# Patient Record
(demographics unavailable — no encounter records)

---

## 2024-11-07 NOTE — IMAGING
[Right] : right knee [There are no fractures, subluxations or dislocations. No significant abnormalities are seen] : There are no fractures, subluxations or dislocations. No significant abnormalities are seen [Degenerative change] : Degenerative change

## 2024-11-07 NOTE — PHYSICAL EXAM
[Right] : right knee [NL (0)] : extension 0 degrees [4___] : hamstring 4[unfilled]/5 [Equivocal] : equivocal Nichole [] : non-antalgic [TWNoteComboBox7] : flexion 125 degrees

## 2024-11-07 NOTE — DISCUSSION/SUMMARY
[de-identified] : Ibuprofen 600mg BID prn pain, which she has.  The patient's orthopaedic condition(s) warrants consideration of consistent or intermittent use of a prescription strength non-steroidal anti-inflammatory medication.  These medications are associated with risks including but not limited to gastrointestinal irritation, kidney damage, hypertension, and bleeding. The patient notes they already have a valid prescription for the medication. The patient understands the risks and will take medications as prescribed previously.  The patient will stop the medication and consult a physician as needed if problems arise.  HEP and PT.  Injection as above.  Follow up 4 - 6 weeks.  Will consider MRI if pain persists.

## 2024-11-07 NOTE — HISTORY OF PRESENT ILLNESS
[Right Leg] : right leg [8] : 8 [6] : 6 [Dull/Aching] : dull/aching [Meds] : meds [Standing] : standing [Walking] : walking [de-identified] : 11/07/2024  :ARASH VALLE , a71 year old female, presents today for right knee, pt stated that her knee has been bothering her for a couple of months. [] : no [FreeTextEntry7] : RT Leg [FreeTextEntry9] : Ibuprofen

## 2024-11-07 NOTE — PROCEDURE
[FreeTextEntry3] : Procedure Name: Large Joint Injection / Aspiration: Celestone, Lidocaine and Marcaine with Ultrasound Evaluation and Guidance   Large Joint Injection was performed because of pain and inflammation. Anesthesia: ethyl chloride sprayed topically.   Celestone: An injection of Celestone 12 mg , 2 cc.   Lidocaine 1%: 3 cc.   Marcaine 0.25%:  3 cc.    Patient has tried OTC's including aspirin, Ibuprofen, Aleve etc or prescription NSAIDS, and/or exercises at home and/ or physical therapy without satisfactory response and Patient has decreased mobility in the joint. The risks, benefits, and alternatives to cortisone injection were explained in full to the patient. Risks outlined include but are not limited to infection, sepsis, bleeding, scarring, skin discoloration, temporary increase in pain, syncopal episode, failure to resolve symptoms, allergic reaction, symptom recurrence, and elevation of blood sugar in diabetics. Patient understood the risks. All questions were answered.   Oral informed consent was obtained.   Sterile technique was utilized for the procedure including the preparation of the solutions used for the injection. Medication was injected into RIGHT KNEE.   Patient tolerated the procedure well. Advised to ice the injection site this evening.  Post Procedure Instructions: Patient was advised to call if redness, pain, or fever occur and apply ice for 15 min. out of every hour for the next 12-24 hours as tolerated. patient was advised to rest the joint(s) for 2 days.    Ultrasound Extremity (59097) was used because of the following reasons: inflammation.   Ultrasound Guidance was used for the following reasons: for accurate placement of needle into knee joint   Diagnostic ultrasound was performed of the knee and is positive for synovitis.   Ultrasound guided injection was performed of the knee, visualization of the needle and placement of injection was performed without complication.

## 2024-11-07 NOTE — ASSESSMENT
[FreeTextEntry1] : Recurrent right knee pain for a few months.  NO new injury or trauma.  lateral and posterior knee pain.  CSI in past with success.   mild oa only. possible meniscus tear.  Patient also seeing doc for lumbar hnp - seeing pain for yolanda.   Not working. PMhx - hypothyroid, high cholesterol, gerd

## 2025-04-17 NOTE — HISTORY OF PRESENT ILLNESS
[Lower back] : lower back [de-identified] :   04/17/2025: 72 year-old female patient presents for LBP. Was diagnosed with a HNP Sept 2024. Has had 2 cortisone shots from Dr. Villela with no lasting relief. Patient has a hx of osteopenia. Was advised no more treatment with PM due to bone density. Has been going to PT on and off since Sept. Notes n/t RLE with some weakness. Is taking Ibuprofen x1 day with some relief. MRI is from [facility]

## 2025-04-17 NOTE — ASSESSMENT
[FreeTextEntry1] :  72 year-old female patient presents for LBP. Was diagnosed with a HNP Sept 2024. Has had 2 cortisone shots from Dr. Villela with no lasting relief. Notes n/t RLE with some weakness. MRI: L4/5 R HNP. Discussed indication for surgery. Vinh start consistent PT   -PT rx -f/u 6 weeks

## 2025-04-17 NOTE — DATA REVIEWED
[MRI] : MRI [Lumbar Spine] : lumbar spine [I independently reviewed and interpreted images and report] : I independently reviewed and interpreted images and report [FreeTextEntry1] : L4/5 R HNP

## 2025-05-27 NOTE — DATA REVIEWED
[MRI] : MRI [Lumbar Spine] : lumbar spine [I independently reviewed and interpreted images and report] : I independently reviewed and interpreted images and report [FreeTextEntry1] : L Spine MRI 3/10/25 1. L4-5 Modic type 1 changes with mod-severe R foraminal stenosis.

## 2025-05-27 NOTE — IMAGING
[de-identified] : L Spine Inspection: No defects or deformities Palpation: No tenderness or spasms in b/l lumbar paraspinal musculature ROM: Full with stiffness Strength: 5/5 b/l hip flexors, knee extensors, ankle dorsiflexors, EHL, ankle plantarflexors Neuro: Sensation LT I Negative b/l SLR Toe and heal walk intact Gait non antalgic

## 2025-05-27 NOTE — HISTORY OF PRESENT ILLNESS
[Lower back] : lower back [de-identified] : 5/27/25: Follow up L Spine. Persisting numbness in RLE. Back pain has slightly improved with PT, attending 3x/week. PCP dc statins t eval if paresthesias were related, was informed should see results in 1 month.   04/17/2025: 72 year-old female patient presents for LBP. Was diagnosed with a HNP Sept 2024. Has had 2 cortisone shots from Dr. Villela with no lasting relief. Patient has a hx of osteopenia. Was advised no more treatment with PM due to bone density. Has been going to PT on and off since Sept. Notes n/t RLE with some weakness. Is taking Ibuprofen x1 day with some relief. [facility] is from [facility]

## 2025-05-27 NOTE — ASSESSMENT
[FreeTextEntry1] :  71 y/o F with chronic lower back pain and RLE radiculopathy. MRI: L4-5 Modic type 1 changes with HNP causing mod-severe R foraminal stenosis. Received LESI's with outside pain mgmt with minimal relief. Attending PT, back pain slightly improved, persistent RLE numbness.   - Refer to our pain mgmt team and recommend a RIGHT L4-5 LESI.  - Discussed sx options: would plan for a L4-5 discectomy alone, considering the possibility of partial relief requiring further intervention (fusion).

## 2025-06-06 NOTE — HISTORY OF PRESENT ILLNESS
[Lower back] : lower back [8] : 8 [6] : 6 [Dull/Aching] : dull/aching [Sharp] : sharp [Shooting] : shooting [Intermittent] : intermittent [Household chores] : household chores [Nothing helps with pain getting better] : Nothing helps with pain getting better [Standing] : standing [Walking] : walking [FreeTextEntry1] : Initial HPI  06/06/2025: Pain started Sept 2024 and is on the RIGHT side of the lower back and radiates down the right leg to the ankle described as a burning sharp pain with associated numbness and tingling. Also gets some pain in the left leg but not as severe. Saw Dr. Pedro who recommended RIGHT L4-5 TFESI    MRI L-spine 3/10/25 independently reviewed: L4-5 Modic type 1 changes with mod-severe R foraminal stenosis. Conservative Care: has been doing PT with some relief  Pain Medications: Ibuprofen 600mg  Past Injections: Had LEDY in the past with temporary relief  Spine surgery: none Blood thinners: none  [] : no [FreeTextEntry6] : NUMBNESS, PINS AND NEEDLES  [FreeTextEntry7] : B/L LEGS

## 2025-06-06 NOTE — DISCUSSION/SUMMARY
[de-identified] : After discussing various treatment options with the patient including but not limited to oral medications, physical therapy, exercise modalities as well as interventional spinal injections, we have decided with the following plan:  - Continue Home exercises, stretching, activity modification, physical therapy, and conservative care. - MRI report and/or images was reviewed and discussed with the patient. - Recommend Right L4-5 Transforaminal Epidural Steroid Injection under fluoroscopic guidance with image. - The risks, benefits and alternatives of the proposed procedure were explained in detail with the patient. The risks outlined include but are not limited to infection, bleeding, post-dural puncture headache, nerve injury, a temporary increase in pain, failure to resolve symptoms, allergic reaction, symptom recurrence, and possible elevation of blood sugar in diabetics. All questions were answered to patient's apparent satisfaction and he/she verbalized an understanding. - Patient is presenting with acute/sub-acute radicular pain with impairment in ADLs and functionality.  The pain has not responded to conservative care including NSAID therapy and/or physical therapy.  There is no bleeding tendency, unstable medical condition, or systemic infection. - Follow up in 1-2 weeks post injection for re-evaluation.

## 2025-06-06 NOTE — PHYSICAL EXAM
[de-identified] : Constitutional; Appears well, no apparent distress Ability to communicate: Normal  Respiratory: non-labored breathing Skin: No rash noted Head: Normocephalic, atraumatic Neck: no visible thyroid enlargement Eyes: Extraocular movements intact Neurologic: Alert and oriented x3 Psychiatric: normal mood, affect and behavior [] : non-antalgic